# Patient Record
Sex: FEMALE | URBAN - METROPOLITAN AREA
[De-identification: names, ages, dates, MRNs, and addresses within clinical notes are randomized per-mention and may not be internally consistent; named-entity substitution may affect disease eponyms.]

---

## 2021-01-09 ENCOUNTER — APPOINTMENT (RX ONLY)
Dept: URBAN - METROPOLITAN AREA CLINIC 55 | Facility: CLINIC | Age: 41
Setting detail: DERMATOLOGY
End: 2021-01-09

## 2021-01-09 DIAGNOSIS — Z41.9 ENCOUNTER FOR PROCEDURE FOR PURPOSES OTHER THAN REMEDYING HEALTH STATE, UNSPECIFIED: ICD-10-CM

## 2021-01-09 PROCEDURE — ? BOTOX

## 2021-01-09 NOTE — PROCEDURE: BOTOX
Show Nasal Units: Yes
Right Periorbital Skin Units: 0
Detail Level: Detailed
Lot #: [id number]
Show Lcl Units: No
Periorbital Skin Units: 12
Additional Area 3 Location: Chin
Additional Area 1 Location: Left Lateral Brow
Forehead Units: 6
Dilution (U/0.1 Cc): 4
Expiration Date (Month Year): 9/30/2023
Consent: Written consent obtained. Risks include but not limited to lid/brow ptosis, bruising, swelling, diplopia, temporary effect, incomplete chemical denervation.
Post-Care Instructions: Patient instructed to not lie down for 4 hours and limit physical activity for 24 hours.
Additional Area 2 Location: Upper lip

## 2021-04-01 ENCOUNTER — APPOINTMENT (RX ONLY)
Dept: URBAN - METROPOLITAN AREA CLINIC 55 | Facility: CLINIC | Age: 41
Setting detail: DERMATOLOGY
End: 2021-04-01

## 2021-04-01 DIAGNOSIS — Z41.9 ENCOUNTER FOR PROCEDURE FOR PURPOSES OTHER THAN REMEDYING HEALTH STATE, UNSPECIFIED: ICD-10-CM

## 2021-04-01 PROCEDURE — ? BOTOX

## 2021-04-01 NOTE — PROCEDURE: BOTOX
Depressor Anguli Oris Units: 0
Show Orbicularis Oculi Units: Yes
Consent: Written consent obtained. Risks include but not limited to lid/brow ptosis, bruising, swelling, diplopia, temporary effect, incomplete chemical denervation.
Forehead Units: 6
Show Lcl Units: No
Post-Care Instructions: Patient instructed to not lie down for 4 hours and limit physical activity for 24 hours.
Additional Area 1 Location: Left Lateral Brow
Additional Area 2 Location: Upper lip
Lot #: [id number]
Glabellar Complex Units: 12
Expiration Date (Month Year): 12/2023
Detail Level: Detailed
Additional Area 3 Location: Chin
Dilution (U/0.1 Cc): 4

## 2021-07-08 ENCOUNTER — APPOINTMENT (RX ONLY)
Dept: URBAN - METROPOLITAN AREA CLINIC 55 | Facility: CLINIC | Age: 41
Setting detail: DERMATOLOGY
End: 2021-07-08

## 2021-07-08 DIAGNOSIS — Z41.9 ENCOUNTER FOR PROCEDURE FOR PURPOSES OTHER THAN REMEDYING HEALTH STATE, UNSPECIFIED: ICD-10-CM

## 2021-07-08 PROCEDURE — ? HYDRAFACIAL

## 2021-07-08 PROCEDURE — ? BOTOX

## 2021-07-08 ASSESSMENT — LOCATION DETAILED DESCRIPTION DERM: LOCATION DETAILED: GLABELLA

## 2021-07-08 ASSESSMENT — LOCATION ZONE DERM: LOCATION ZONE: FACE

## 2021-07-08 ASSESSMENT — LOCATION SIMPLE DESCRIPTION DERM: LOCATION SIMPLE: GLABELLA

## 2021-07-08 NOTE — PROCEDURE: BOTOX
Additional Area 5 Units: 0
Periorbital Skin Units: 12
Show Right And Left Periorbital Units: No
Show Additional Area 3: Yes
Forehead Units: 8
Consent: Written consent obtained. Patient denies pregnancy and breastfeeding. Risks include but not limited to lid/brow ptosis, bruising, swelling, diplopia, temporary effect, incomplete chemical denervation.
Additional Area 2 Location: chin
Detail Level: Detailed
Dilution (U/0.1 Cc): 4
Price (Use Numbers Only, No Special Characters Or $): 735
Additional Area 1 Location: upper lip
Post-Care Instructions: Patient instructed to not lie down for 4 hours post procedure and informed not to manipulate treatment area for 4 hours. \\nRecommended follow up in 7 days if needed.
Lot #: [id number]
Expiration Date (Month Year): 11/2023

## 2021-07-08 NOTE — PROCEDURE: HYDRAFACIAL
Glycolic Acid %: 15%
Comments: Applied SkinBetter compact spf post treatment.
Detail Level: Zone
Indication: anti-aging
Additional Vacuum Pressure (Won't Render If 0): 0
Treatment Number: 1
Post-Care Instructions: I reviewed with the patient in detail post-care instructions. Patient should stay away from the sun and wear sun protection until treated areas are fully healed.
Number Of Passes: 3
Vacuum Pressure: 10
Consent: Written consent obtained, risks reviewed including but not limited to crusting, scabbing, blistering, scarring, darker or lighter pigmentary change, bruising, and/or incomplete response.

## 2021-11-18 ENCOUNTER — APPOINTMENT (RX ONLY)
Dept: URBAN - METROPOLITAN AREA CLINIC 55 | Facility: CLINIC | Age: 41
Setting detail: DERMATOLOGY
End: 2021-11-18

## 2021-11-18 DIAGNOSIS — Z41.9 ENCOUNTER FOR PROCEDURE FOR PURPOSES OTHER THAN REMEDYING HEALTH STATE, UNSPECIFIED: ICD-10-CM

## 2021-11-18 PROCEDURE — ? BOTOX

## 2021-11-18 NOTE — PROCEDURE: BOTOX
Additional Area 2 Units: 0
Post-Care Instructions: Patient instructed to not lie down for 4 hours and limit physical activity for 24 hours.
Additional Area 4 Location: upper cutaneous lip
Show Mentalis Units: No
Show Topical Anesthesia: Yes
Glabellar Complex Units: 12
Additional Area 1 Location: Left Lateral Brow
Consent: Written consent obtained. Risks include but not limited to lid/brow ptosis, bruising, swelling, diplopia, temporary effect, incomplete chemical denervation.
Additional Area 2 Location: Upper lip
Detail Level: Detailed
Expiration Date (Month Year): 01/2024
Forehead Units: 8
Dilution (U/0.1 Cc): 4
Additional Area 3 Location: Chin
Lot #: [id number]

## 2022-03-03 ENCOUNTER — APPOINTMENT (RX ONLY)
Dept: URBAN - METROPOLITAN AREA CLINIC 55 | Facility: CLINIC | Age: 42
Setting detail: DERMATOLOGY
End: 2022-03-03

## 2022-03-03 DIAGNOSIS — Z41.9 ENCOUNTER FOR PROCEDURE FOR PURPOSES OTHER THAN REMEDYING HEALTH STATE, UNSPECIFIED: ICD-10-CM

## 2022-03-03 PROCEDURE — ? COSMETIC CONSULTATION: GENERAL

## 2022-03-03 PROCEDURE — ? BOTOX

## 2022-03-03 PROCEDURE — ? COSMETIC CONSULTATION: FILLERS

## 2022-03-03 NOTE — PROCEDURE: BOTOX
Additional Area 4 Location: upper cutaneous lip
Lot #: [id number]
Show Orbicularis Oculi Units: Yes
Lateral Platysmal Bands Units: 0
Additional Area 2 Units: 2
Show Lcl Units: No
Post-Care Instructions: Patient instructed to not lie down for 4 hours and limit physical activity for 24 hours.
Glabellar Complex Units: 12
Forehead Units: 8
Consent: Written consent obtained. Risks include but not limited to lid/brow ptosis, bruising, swelling, diplopia, temporary effect, incomplete chemical denervation.
Detail Level: Detailed
Additional Area 1 Location: Left Lateral Brow
Additional Area 3 Location: Chin
Additional Area 2 Location: Upper lip
Dilution (U/0.1 Cc): 4
Expiration Date (Month Year): 01/2024

## 2022-03-14 ENCOUNTER — APPOINTMENT (RX ONLY)
Dept: URBAN - METROPOLITAN AREA CLINIC 55 | Facility: CLINIC | Age: 42
Setting detail: DERMATOLOGY
End: 2022-03-14

## 2022-03-14 DIAGNOSIS — Z41.9 ENCOUNTER FOR PROCEDURE FOR PURPOSES OTHER THAN REMEDYING HEALTH STATE, UNSPECIFIED: ICD-10-CM

## 2022-03-14 PROCEDURE — ? BOTOX

## 2022-03-14 PROCEDURE — ? FILLERS

## 2022-03-14 NOTE — PROCEDURE: BOTOX
Expiration Date (Month Year): 5/31/2024
Additional Area 1 Location: cutaneous upper lip
Show Additional Area 1: Yes
Show Right And Left Pupillary Line Units: No
Depressor Anguli Oris Units: 0
Dilution (U/0.1 Cc): 4
Consent: Written consent obtained. Patient denies pregnancy and breastfeeding. Risks include but not limited to lid/brow ptosis, bruising, swelling, diplopia, temporary effect, incomplete chemical denervation.
Post-Care Instructions: Patient instructed to not lie down for 4 hours post procedure and informed not to manipulate treatment area for 4 hours. \\nRecommended follow up in 7 days if needed.
Forehead Units: 2
Lot #: [id number]
Detail Level: Detailed
Additional Area 2 Location: chin

## 2022-03-14 NOTE — PROCEDURE: FILLERS
Dorsal Hands Filler Volume In Cc: 0
Expiration Date (Month Year): 2/28/2023
Use Map Statement For Sites (Optional): No
Lot #: [id number]
Lot #: [id number]
Filler: Restylane Contour
Expiration Date (Month Year): 01/31/23
Expiration Date (Month Year): 05/31/23
Post-Care Instructions: Patient instructed to apply ice to reduce swelling. Post care handout given to patient.
Consent: Written consent obtained. Risks include but not limited to bruising, beading, irregular texture, ulceration, infection, allergic reaction, scar formation, incomplete augmentation, temporary nature, procedural pain.
Lot #: [id number]
Expiration Date (Month Year): 07/31/24
Lot #: [id number]
Include Cannula Size?: 27G
Expiration Date (Month Year): 07/31/2024
Detail Level: Detailed
Map Statment: See Attach Map for Complete Details
Price (Use Numbers Only, No Special Characters Or $): 690
Anesthesia Type: 1% lidocaine with epinephrine
Anesthesia Volume In Cc: 0.3
Additional Anesthesia Volume In Cc: 6

## 2022-03-31 ENCOUNTER — APPOINTMENT (RX ONLY)
Dept: URBAN - METROPOLITAN AREA CLINIC 55 | Facility: CLINIC | Age: 42
Setting detail: DERMATOLOGY
End: 2022-03-31

## 2022-03-31 DIAGNOSIS — Z41.9 ENCOUNTER FOR PROCEDURE FOR PURPOSES OTHER THAN REMEDYING HEALTH STATE, UNSPECIFIED: ICD-10-CM

## 2022-03-31 PROCEDURE — ? FILLERS

## 2022-03-31 NOTE — PROCEDURE: FILLERS
Include Cannula Information In Note?: No
Brows Filler Volume In Cc: 0
Map Statment: See Attach Map for Complete Details
Detail Level: Detailed
Expiration Date (Month Year): 07/31/2024
Consent: Written consent obtained. Risks include but not limited to bruising, beading, irregular texture, ulceration, infection, allergic reaction, scar formation, incomplete augmentation, temporary nature, procedural pain.
Expiration Date (Month Year): 04/30/2023
Expiration Date (Month Year): 07/31/24
Expiration Date (Month Year): 05/31/23
Post-Care Instructions: Patient instructed to apply ice to reduce swelling. Post care handout given to patient.
Anesthesia Type: 1% lidocaine with epinephrine
Lot #: [id number]
Expiration Date (Month Year): 01/31/23
Lot #: [id number]
Lot #: [id number]
Include Cannula Information In Note?: Yes
Additional Anesthesia Volume In Cc: 6
Anesthesia Volume In Cc: 0.3
Price (Use Numbers Only, No Special Characters Or $): 422
Filler: Restylane Contour
Include Cannula Size?: 25G
Lot #: [id number]

## 2022-06-04 ENCOUNTER — APPOINTMENT (RX ONLY)
Dept: URBAN - METROPOLITAN AREA CLINIC 55 | Facility: CLINIC | Age: 42
Setting detail: DERMATOLOGY
End: 2022-06-04

## 2022-06-04 DIAGNOSIS — Z41.9 ENCOUNTER FOR PROCEDURE FOR PURPOSES OTHER THAN REMEDYING HEALTH STATE, UNSPECIFIED: ICD-10-CM

## 2022-06-04 PROCEDURE — ? BOTOX

## 2022-06-04 NOTE — PROCEDURE: BOTOX
Show Masseter Units: Yes
Expiration Date (Month Year): 05/31/24
Show Ucl Units: No
Depressor Anguli Oris Units: 0
Additional Area 5 Location: Chin
Additional Area 3 Location: Nasalis
Additional Area 4 Location: gummy smile
Glabellar Complex Units: 12
Lot #: [id number]
Additional Area 1 Location: under eye “jelly roll”
Dilution (U/0.1 Cc): 4
Additional Area 2 Location: Lip
Consent: Written consent obtained. Risks include but not limited to lid/brow ptosis, bruising, swelling, diplopia, temporary effect, incomplete chemical denervation.
Post-Care Instructions: Patient instructed to not lie down for 4 hours and limit physical activity for 24 hours.
Detail Level: Detailed
Forehead Units: 6

## 2022-07-11 ENCOUNTER — APPOINTMENT (RX ONLY)
Dept: URBAN - METROPOLITAN AREA CLINIC 55 | Facility: CLINIC | Age: 42
Setting detail: DERMATOLOGY
End: 2022-07-11

## 2022-07-11 DIAGNOSIS — Z41.9 ENCOUNTER FOR PROCEDURE FOR PURPOSES OTHER THAN REMEDYING HEALTH STATE, UNSPECIFIED: ICD-10-CM

## 2022-07-11 PROCEDURE — ? HYDRAFACIAL

## 2022-07-11 NOTE — PROCEDURE: HYDRAFACIAL
Number Of Passes Step 2: 1
Procedure: Exfoliation
Consent: Written consent obtained, risks reviewed including but not limited to crusting, scabbing, blistering, scarring, darker or lighter pigmentary change, bruising, and/or incomplete response.
Location: face
Vacuum Pressure Low Setting (Will Not Render If Set To 0): 0
Procedure: Fusion
Solution: GlySal 7.5%
Tip: Hydropeel Tip, Teal
Indication: anti-aging
Procedure: Extend and Protect
Solution: Beta-HD
Solution: Activ-4
Tip: Hydropeel Tip, Clear
Solution Override
Procedure: Peel
Post-Care Instructions: I reviewed with the patient in detail post-care instructions. Patient should stay away from the sun and wear sun protection until treated areas are fully healed.
Tip: Hydropeel Tip, Blue
Procedure: Extraction
Tip Override
Solution: Antiox-6

## 2025-05-20 ENCOUNTER — APPOINTMENT (OUTPATIENT)
Dept: URBAN - METROPOLITAN AREA CLINIC 55 | Facility: CLINIC | Age: 45
Setting detail: DERMATOLOGY
End: 2025-05-20

## 2025-05-20 DIAGNOSIS — Z41.9 ENCOUNTER FOR PROCEDURE FOR PURPOSES OTHER THAN REMEDYING HEALTH STATE, UNSPECIFIED: ICD-10-CM

## 2025-05-20 PROCEDURE — ? INVENTORY

## 2025-05-20 PROCEDURE — ? BOTOX

## 2025-05-20 NOTE — PROCEDURE: BOTOX
Anterior Platysmal Bands Units: 0
Show Levator Superior Units: Yes
Consent obtained and risk reviewed.
Show Right And Left Periorbital Units: No
Depressor Anguli Oris Units: 6
Dilution (U/0.1 Cc): 4
Post-Care Instructions: Discussed not to lie down flat  or vigorously rub the treatment area for the next 4 hours .
Periorbital Skin Units: 12
Comments: Make up was removed from treatment area and then prepped with 70% isopropyl alcohol prior to injections.
Additional Area 1 Location: upper lip
Incrementing Botox Units: By 0.5 Units
Additional Area 1 Units: 2
Detail Level: Detailed
Forehead Units: 8
Show Inventory Tab: Show

## 2025-06-20 ENCOUNTER — APPOINTMENT (OUTPATIENT)
Dept: URBAN - METROPOLITAN AREA CLINIC 55 | Facility: CLINIC | Age: 45
Setting detail: DERMATOLOGY
End: 2025-06-20

## 2025-06-20 DIAGNOSIS — Z41.9 ENCOUNTER FOR PROCEDURE FOR PURPOSES OTHER THAN REMEDYING HEALTH STATE, UNSPECIFIED: ICD-10-CM

## 2025-06-20 PROCEDURE — ? COSMETIC FOLLOW-UP

## 2025-06-20 PROCEDURE — ? INVENTORY

## 2025-07-07 ENCOUNTER — APPOINTMENT (OUTPATIENT)
Dept: URBAN - METROPOLITAN AREA CLINIC 55 | Facility: CLINIC | Age: 45
Setting detail: DERMATOLOGY
End: 2025-07-07

## 2025-07-07 DIAGNOSIS — Z41.9 ENCOUNTER FOR PROCEDURE FOR PURPOSES OTHER THAN REMEDYING HEALTH STATE, UNSPECIFIED: ICD-10-CM

## 2025-07-07 PROCEDURE — ? FILLERS

## 2025-07-07 PROCEDURE — ? INVENTORY

## 2025-07-07 NOTE — PROCEDURE: FILLERS
Brows Filler Volume In Cc: 0
Include Cannula Size?: 25G
Consent obtained and risks reviewed.
Include Cannula Information In Note?: No
Post-Care Instructions: Alastin post injection serum applied to injection sites post procedure. Patient instructed to apply ice to the treatment to reduce swelling.  Discussed to wait 2-3 weeks post injection to assess the outcome prior to  receiving any additional filler services.
Include Cannula Length?: 1.5 inch
Filler: Restylane Contour
Use Map Statement For Sites (Optional): Yes
Filler Comments: Injected the lateral and anterior cheek with 29g hypodermic needle to the Supra-periosteal plane after negative aspiration observed.
Map Statment: See Attach Map for Complete Details
Aspiration Statement: Aspiration was performed . Negative aspiration was observed for 8 seconds prior to injecting site with filler.
Additional Comments: site was prepped with 70% isopropyl alcohol
Anesthesia Type: 1% lidocaine with epinephrine
Anesthesia Volume In Cc: 0.2
Detail Level: Detailed

## 2025-07-14 ENCOUNTER — APPOINTMENT (OUTPATIENT)
Dept: URBAN - METROPOLITAN AREA CLINIC 55 | Facility: CLINIC | Age: 45
Setting detail: DERMATOLOGY
End: 2025-07-14

## 2025-07-14 DIAGNOSIS — Z41.9 ENCOUNTER FOR PROCEDURE FOR PURPOSES OTHER THAN REMEDYING HEALTH STATE, UNSPECIFIED: ICD-10-CM

## 2025-07-14 PROCEDURE — ? INVENTORY

## 2025-07-14 PROCEDURE — ? HYALURONIDASE INJECTION

## 2025-07-14 ASSESSMENT — LOCATION DETAILED DESCRIPTION DERM: LOCATION DETAILED: RIGHT MEDIAL BUCCAL CHEEK

## 2025-07-14 ASSESSMENT — LOCATION ZONE DERM: LOCATION ZONE: FACE

## 2025-07-14 ASSESSMENT — LOCATION SIMPLE DESCRIPTION DERM: LOCATION SIMPLE: RIGHT CHEEK

## 2025-07-14 NOTE — PROCEDURE: HYALURONIDASE INJECTION
Total Volume (Ccs): 0.1
Detail Level: Detailed
Hyaluronidase Preparation: hyaluronidase
Consent obtained. The risks of contour defects and possible need of further treatment with charge were reviewed with the patient prior to the injection.

## 2025-07-31 ENCOUNTER — APPOINTMENT (OUTPATIENT)
Dept: URBAN - METROPOLITAN AREA CLINIC 55 | Facility: CLINIC | Age: 45
Setting detail: DERMATOLOGY
End: 2025-07-31

## 2025-07-31 DIAGNOSIS — Z41.9 ENCOUNTER FOR PROCEDURE FOR PURPOSES OTHER THAN REMEDYING HEALTH STATE, UNSPECIFIED: ICD-10-CM

## 2025-07-31 PROCEDURE — ? INVENTORY

## 2025-07-31 PROCEDURE — ? HYLENEX INJECTION

## 2025-07-31 PROCEDURE — ? BOTOX

## 2025-07-31 NOTE — PROCEDURE: BOTOX
Show Forehead Units: Yes
Anterior Platysmal Bands Units: 0
Depressor Anguli Oris Units: 6
Show Right And Left Pupillary Line Units: No
Periorbital Skin Units: 12
Incrementing Botox Units: By 0.5 Units
Additional Area 1 Location: upper lip
Consent obtained and risk reviewed.
Additional Area 1 Units: 2
Post-Care Instructions: Discussed not to lie down flat  or vigorously rub the treatment area for the next 4 hours .
Detail Level: Detailed
Dilution (U/0.1 Cc): 4
Forehead Units: 8
Show Inventory Tab: Show
Comments: Make up was removed from treatment area and then prepped with 70% isopropyl alcohol prior to injections.

## 2025-07-31 NOTE — PROCEDURE: HYLENEX INJECTION
Show Inventory Tab: Show
Additional Area 5 Volume In Cc: 0
Number Of Vials Of Hylenex Used (Required For Inventory): 1
Anesthesia Type: 1% lidocaine with epinephrine
Use Map Statement For Sites (Optional): Yes
Anesthesia Volume In Cc: 0.5
Procedural Text: The area of injected with prepped with 70% isopropyl alcohol prior to treatment. \\nHylenex was administered to the treatment areas noted above. Discussed the risk if bruising and swelling post injections. Discussed there may be the need of a second treatment to get the desired results or the need of HA filler.
Consent: Verbal consent obtained. Risks include but not limited to bruising, beading, irregular texture, ulceration, infection, allergic reaction, scar formation, incomplete augmentation, temporary nature, procedural pain.
Map Statment: See Attach Map for Complete Details
Post-Care Instructions: Patient advised that swelling or bruising may occur and to avoid manipulating the area. Advised that results may be seen within 24–72 hours, with additional treatments possibly required. Advised to follow up in 1–2 weeks for reassessment.
Include Cannula Information In Note?: No
Detail Level: Detailed
Injectible: Hylenex

## 2025-08-13 ENCOUNTER — APPOINTMENT (OUTPATIENT)
Dept: URBAN - METROPOLITAN AREA CLINIC 55 | Facility: CLINIC | Age: 45
Setting detail: DERMATOLOGY
End: 2025-08-13

## 2025-08-13 DIAGNOSIS — Z41.9 ENCOUNTER FOR PROCEDURE FOR PURPOSES OTHER THAN REMEDYING HEALTH STATE, UNSPECIFIED: ICD-10-CM

## 2025-08-13 PROCEDURE — ? HYALURONIDASE INJECTION

## 2025-08-13 PROCEDURE — ? INVENTORY

## 2025-08-13 ASSESSMENT — LOCATION DETAILED DESCRIPTION DERM: LOCATION DETAILED: RIGHT CENTRAL BUCCAL CHEEK

## 2025-08-13 ASSESSMENT — LOCATION SIMPLE DESCRIPTION DERM: LOCATION SIMPLE: RIGHT CHEEK

## 2025-08-13 ASSESSMENT — LOCATION ZONE DERM: LOCATION ZONE: FACE

## 2025-08-25 ENCOUNTER — APPOINTMENT (OUTPATIENT)
Dept: URBAN - METROPOLITAN AREA CLINIC 55 | Facility: CLINIC | Age: 45
Setting detail: DERMATOLOGY
End: 2025-08-25

## 2025-08-25 DIAGNOSIS — Z41.9 ENCOUNTER FOR PROCEDURE FOR PURPOSES OTHER THAN REMEDYING HEALTH STATE, UNSPECIFIED: ICD-10-CM

## 2025-08-25 PROCEDURE — ? INVENTORY

## 2025-08-25 PROCEDURE — ? FILLERS
